# Patient Record
Sex: MALE | Race: WHITE | Employment: PART TIME | URBAN - METROPOLITAN AREA
[De-identification: names, ages, dates, MRNs, and addresses within clinical notes are randomized per-mention and may not be internally consistent; named-entity substitution may affect disease eponyms.]

---

## 2017-01-27 PROCEDURE — 87086 URINE CULTURE/COLONY COUNT: CPT | Performed by: UROLOGY

## 2017-02-02 PROCEDURE — 88305 TISSUE EXAM BY PATHOLOGIST: CPT | Performed by: UROLOGY

## 2017-02-02 PROCEDURE — 88344 IMHCHEM/IMCYTCHM EA MLT ANTB: CPT | Performed by: UROLOGY

## 2017-02-15 PROBLEM — C61 RECURRENT PROSTATE ADENOCARCINOMA (HCC): Status: ACTIVE | Noted: 2017-02-15

## 2017-08-24 PROCEDURE — 84403 ASSAY OF TOTAL TESTOSTERONE: CPT | Performed by: UROLOGY

## 2017-09-21 PROBLEM — Z71.9 HEALTH EDUCATION/COUNSELING: Status: ACTIVE | Noted: 2017-09-21

## 2017-12-06 PROBLEM — R63.5 WEIGHT GAIN: Status: ACTIVE | Noted: 2017-12-06

## 2018-01-04 PROBLEM — K62.5 RECTAL BLEEDING: Status: ACTIVE | Noted: 2018-01-04

## 2018-03-01 PROBLEM — D64.9 ANEMIA, UNSPECIFIED: Status: ACTIVE | Noted: 2018-03-01

## 2019-06-04 PROCEDURE — 88305 TISSUE EXAM BY PATHOLOGIST: CPT | Performed by: INTERNAL MEDICINE

## 2019-09-08 PROBLEM — M25.552 LEFT HIP PAIN: Status: ACTIVE | Noted: 2019-09-08

## 2019-09-08 PROBLEM — Z71.9 HEALTH EDUCATION/COUNSELING: Status: RESOLVED | Noted: 2017-09-21 | Resolved: 2019-09-08

## 2019-09-08 PROBLEM — K62.5 RECTAL BLEEDING: Status: RESOLVED | Noted: 2018-01-04 | Resolved: 2019-09-08

## 2019-09-08 PROBLEM — D64.9 ANEMIA, UNSPECIFIED: Status: RESOLVED | Noted: 2018-03-01 | Resolved: 2019-09-08

## 2019-09-25 ENCOUNTER — TELEPHONE (OUTPATIENT)
Dept: HEMATOLOGY/ONCOLOGY | Facility: HOSPITAL | Age: 74
End: 2019-09-25

## 2019-09-25 NOTE — TELEPHONE ENCOUNTER
Left message to check in with Uday Schwartz on how he was feeling. Asked him to update me when he had a [email or web address]

## 2019-10-10 PROCEDURE — 83883 ASSAY NEPHELOMETRY NOT SPEC: CPT | Performed by: PHYSICIAN ASSISTANT

## 2019-10-10 PROCEDURE — 86334 IMMUNOFIX E-PHORESIS SERUM: CPT | Performed by: PHYSICIAN ASSISTANT

## 2019-10-10 PROCEDURE — 84165 PROTEIN E-PHORESIS SERUM: CPT | Performed by: PHYSICIAN ASSISTANT

## 2019-10-10 PROCEDURE — 82784 ASSAY IGA/IGD/IGG/IGM EACH: CPT | Performed by: PHYSICIAN ASSISTANT

## 2021-09-30 PROBLEM — R63.5 WEIGHT GAIN: Status: RESOLVED | Noted: 2017-12-06 | Resolved: 2021-09-30
